# Patient Record
Sex: FEMALE | Employment: FULL TIME | URBAN - METROPOLITAN AREA
[De-identification: names, ages, dates, MRNs, and addresses within clinical notes are randomized per-mention and may not be internally consistent; named-entity substitution may affect disease eponyms.]

---

## 2022-07-06 ENCOUNTER — HOSPITAL ENCOUNTER (EMERGENCY)
Age: 62
Discharge: HOME | End: 2022-07-06

## 2022-07-06 VITALS
SYSTOLIC BLOOD PRESSURE: 165 MMHG | WEIGHT: 125 LBS | HEIGHT: 62 IN | RESPIRATION RATE: 16 BRPM | TEMPERATURE: 97.3 F | OXYGEN SATURATION: 100 % | DIASTOLIC BLOOD PRESSURE: 86 MMHG | BODY MASS INDEX: 23 KG/M2 | HEART RATE: 65 BPM

## 2022-07-06 DIAGNOSIS — T63.481A LOCAL REACTION TO INSECT STING, ACCIDENTAL OR UNINTENTIONAL, INITIAL ENCOUNTER: Primary | ICD-10-CM

## 2022-07-06 PROCEDURE — 96372 THER/PROPH/DIAG INJ SC/IM: CPT

## 2022-07-06 PROCEDURE — 6370000000 HC RX 637 (ALT 250 FOR IP): Performed by: PHYSICIAN ASSISTANT

## 2022-07-06 PROCEDURE — 99284 EMERGENCY DEPT VISIT MOD MDM: CPT

## 2022-07-06 PROCEDURE — 6360000002 HC RX W HCPCS: Performed by: PHYSICIAN ASSISTANT

## 2022-07-06 RX ORDER — FAMOTIDINE 20 MG/1
20 TABLET, FILM COATED ORAL ONCE
Status: COMPLETED | OUTPATIENT
Start: 2022-07-06 | End: 2022-07-06

## 2022-07-06 RX ORDER — METHYLPREDNISOLONE SODIUM SUCCINATE 125 MG/2ML
125 INJECTION, POWDER, LYOPHILIZED, FOR SOLUTION INTRAMUSCULAR; INTRAVENOUS ONCE
Status: COMPLETED | OUTPATIENT
Start: 2022-07-06 | End: 2022-07-06

## 2022-07-06 RX ORDER — DIPHENHYDRAMINE HCL 25 MG
25 TABLET ORAL ONCE
Status: DISCONTINUED | OUTPATIENT
Start: 2022-07-06 | End: 2022-07-06 | Stop reason: HOSPADM

## 2022-07-06 RX ORDER — METHYLPREDNISOLONE 4 MG/1
TABLET ORAL
Qty: 21 TABLET | Refills: 0 | Status: SHIPPED | OUTPATIENT
Start: 2022-07-06 | End: 2022-07-12

## 2022-07-06 RX ORDER — FAMOTIDINE 20 MG/1
20 TABLET, FILM COATED ORAL 2 TIMES DAILY
Qty: 14 TABLET | Refills: 0 | Status: SHIPPED | OUTPATIENT
Start: 2022-07-06 | End: 2022-07-13

## 2022-07-06 RX ADMIN — METHYLPREDNISOLONE SODIUM SUCCINATE 125 MG: 125 INJECTION, POWDER, FOR SOLUTION INTRAMUSCULAR; INTRAVENOUS at 11:18

## 2022-07-06 RX ADMIN — FAMOTIDINE 20 MG: 20 TABLET ORAL at 11:17

## 2022-07-06 ASSESSMENT — PAIN SCALES - GENERAL: PAINLEVEL_OUTOF10: 2

## 2022-07-06 ASSESSMENT — PAIN - FUNCTIONAL ASSESSMENT
PAIN_FUNCTIONAL_ASSESSMENT: 0-10
PAIN_FUNCTIONAL_ASSESSMENT: NONE - DENIES PAIN
PAIN_FUNCTIONAL_ASSESSMENT: ACTIVITIES ARE NOT PREVENTED

## 2022-07-06 ASSESSMENT — PAIN DESCRIPTION - PAIN TYPE: TYPE: ACUTE PAIN

## 2022-07-06 ASSESSMENT — PAIN DESCRIPTION - DESCRIPTORS: DESCRIPTORS: ITCHING;PRESSURE

## 2022-07-06 ASSESSMENT — PAIN DESCRIPTION - FREQUENCY: FREQUENCY: CONTINUOUS

## 2022-07-06 ASSESSMENT — PAIN DESCRIPTION - ORIENTATION: ORIENTATION: LEFT

## 2022-07-06 ASSESSMENT — PAIN DESCRIPTION - LOCATION: LOCATION: EYE

## 2022-07-06 ASSESSMENT — PAIN DESCRIPTION - ONSET: ONSET: ON-GOING

## 2022-07-06 NOTE — ED PROVIDER NOTES
[address]  Department of Emergency Medicine   ED  Encounter Note  Admit Date/RoomTime: 2022 10:40 AM  ED Room:   NAME: Kenyetta Raymond  : 1960  MRN: [id number]     Chief Complaint:  Facial Swelling (expose to daughters dog, left eye swelling began yesterday and worsening tody. denies drainage  denies SOB)    HISTORY OF PRESENT ILLNESS        Kenyetta Raymond is a 60 y.o. female who presents to the ED with a complaint of allergic reaction with facial swelling. Patient states she does have animal allergies with swelling, itching, redness, sneezing. Patient states her daughter had twins so she has been over her daughter's house quite frequently to help with the babies. Spent the night there 5 days ago. When she woke up their dog had been sleeping in the bed with her. She states her face was all swollen and red. Itchy. She did take a Benadryl which did help. She now presents because the left side of her face is more swollen than the right. Puffy. Red and warm to the touch. She is concerned that perhaps it is more than just her animal allergy. She is concerned that perhaps she was bit by something that was on the dog. Patient denies any sore throat or throat swelling. No difficulty swallowing. No rash or hives anywhere. No shortness of breath. She rates the discomfort a 2 out of 10. She has not taken any more Benadryl because she does not want to be tired when she supposed to be helping with the twin grandchildren. ROS   Pertinent positives and negatives are stated within HPI, all other systems reviewed and are negative. Past Medical History:  has a past medical history of Glaucoma. Surgical History:  has a past surgical history that includes eye surgery. Social History:  reports that she has quit smoking. She quit after 10.00 years of use. She does not have any smokeless tobacco history on file.  She reports current alcohol use of about 2.0 standard drinks of alcohol per week. She reports that she does not use drugs. Family History: family history is not on file. Allergies: Penicillins    PHYSICAL EXAM   Oxygen Saturation Interpretation: Normal on room air analysis. ED Triage Vitals   BP Temp Temp src Heart Rate Resp SpO2 Height Weight   07/06/22 1025 07/06/22 1025 -- 07/06/22 1025 07/06/22 1025 07/06/22 1025 07/06/22 1046 07/06/22 1046   (!) 165/86 97.3 °F (36.3 °C)  65 16 100 % 5' 2\" (1.575 m) 125 lb (56.7 kg)       General:  NAD. Alert and Oriented. Well-appearing. Skin:  Warm, dry. Positive facial swelling and edema left orbital, left cheek with some trailing erythema across the nose and into the right cheek. To the left cheek I do see a pustule carlyn. So I would agree patient was probably bitten by something. No generalized rash, no hives. Head:  Normocephalic. Atraumatic. Eyes:  EOMI. Conjunctiva normal.  Positive lid edema on the left. But movement of the eye is not painful. Palpation is not painful. This is not an orbital cellulitis. ENT:  Oral mucosa moist.  Airway patent. Posterior pharynx without erythema, without swelling, without exudate. Uvula is midline with equal rise. Neck:  Supple. Normal ROM. Respiratory:  No respiratory distress. No labored breathing. Lungs clear without rales, rhonchi or wheezing. Cardiovascular:  Regular rate. No Murmur. No peripheral edema. Extremities warm and good color. Extremities:  Normal ROM. Nontender to palpation. Atraumatic. Back:  Normal ROM. Nontender to palpation. Neuro:  Alert and Oriented to person, place, time and situation. Normal LOC. Moves all extremities. Speech fluent. Psych:  Calm and Cooperative. Normal thought process. Normal judgement. Lab / Imaging Results   (All laboratory and radiology results have been personally reviewed by myself)  Labs:  No results found for this visit on 07/06/22. Imaging:   All Radiology results interpreted by Radiologist unless otherwise noted. No orders to display       ED Course / Medical Decision Making     Medications   diphenhydrAMINE (BENADRYL) tablet 25 mg (25 mg Oral Not Given 7/6/22 1117)   methylPREDNISolone sodium (SOLU-MEDROL) injection 125 mg (125 mg IntraMUSCular Given 7/6/22 1118)   famotidine (PEPCID) tablet 20 mg (20 mg Oral Given 7/6/22 1117)        Re-examination:  7/6/22       Time: 1120 patient refused the Benadryl. Patients condition remains unchanged. Consult(s):   None    Procedure(s):   none    MDM:   Patient given IM dose of Solu-Medrol and oral Pepcid. She refused the Benadryl. Discussed with her that she will start the Medrol Dosepak tomorrow. She can take Pepcid again tonight. States she will take the Benadryl before going to sleep. To watch for any shortness of breath, throat swelling or hives. Return as needed. Plan of Care/Counseling:  Physician Assistant on duty reviewed today's visit with the patient in addition to providing specific details for the plan of care and counseling regarding the diagnosis and prognosis. Questions are answered at this time and are agreeable with the plan. ASSESSMENT     1. Local reaction to insect sting, accidental or unintentional, initial encounter      PLAN   Discharged home. Patient condition is good    New Medications     New Prescriptions    FAMOTIDINE (PEPCID) 20 MG TABLET    Take 1 tablet by mouth 2 times daily for 7 days    METHYLPREDNISOLONE (MEDROL, ROXANE,) 4 MG TABLET    Take as directed on package insert days 1-6     Electronically signed by PAL Do   DD: 7/6/22  **This report was transcribed using voice recognition software. Every effort was made to ensure accuracy; however, inadvertent computerized transcription errors may be present.   END OF ED PROVIDER NOTE       Sony Do  07/06/22 [id number]

## 2024-03-22 PROCEDURE — 88305 TISSUE EXAM BY PATHOLOGIST: CPT | Performed by: DERMATOLOGY

## 2024-03-26 ENCOUNTER — LAB REQUISITION (OUTPATIENT)
Dept: DERMATOPATHOLOGY | Facility: CLINIC | Age: 64
End: 2024-03-26
Payer: COMMERCIAL

## 2024-03-26 DIAGNOSIS — D48.5 NEOPLASM OF UNCERTAIN BEHAVIOR OF SKIN: ICD-10-CM

## 2024-03-27 LAB
LABORATORY COMMENT REPORT: NORMAL
PATH REPORT.FINAL DX SPEC: NORMAL
PATH REPORT.GROSS SPEC: NORMAL
PATH REPORT.MICROSCOPIC SPEC OTHER STN: NORMAL
PATH REPORT.RELEVANT HX SPEC: NORMAL
PATH REPORT.TOTAL CANCER: NORMAL

## 2024-04-08 PROCEDURE — 88305 TISSUE EXAM BY PATHOLOGIST: CPT | Performed by: DERMATOLOGY

## 2024-04-09 ENCOUNTER — LAB REQUISITION (OUTPATIENT)
Dept: DERMATOPATHOLOGY | Facility: CLINIC | Age: 64
End: 2024-04-09
Payer: COMMERCIAL

## 2024-04-09 DIAGNOSIS — C44.729 SQUAMOUS CELL CARCINOMA OF SKIN OF LEFT LOWER LIMB, INCLUDING HIP: ICD-10-CM

## 2025-03-05 PROCEDURE — 88305 TISSUE EXAM BY PATHOLOGIST: CPT | Performed by: DERMATOLOGY

## 2025-03-06 ENCOUNTER — LAB REQUISITION (OUTPATIENT)
Dept: DERMATOPATHOLOGY | Facility: CLINIC | Age: 65
End: 2025-03-06
Payer: COMMERCIAL

## 2025-03-06 DIAGNOSIS — D48.5 NEOPLASM OF UNCERTAIN BEHAVIOR OF SKIN: ICD-10-CM
